# Patient Record
(demographics unavailable — no encounter records)

---

## 2025-02-12 NOTE — ASSESSMENT
[FreeTextEntry1] : After discussing various treatment options with the patient including but not limited to oral medications, physical therapy, exercise, modalities as well as interventional spinal injections, we have decided with the following plan:   1) Intervention Injection Therapy: I personally reviewed the MRI/CT scan images and agree with the radiologist's report. The radiological findings were discussed with the patient. The risks, benefits, contents and alternatives to injection were explained in full to the patient. Risks outlined include but are not limited to infection, sepsis, bleeding, post-dural puncture headache, nerve damage, temporary increase in pain, syncopal episode, failure to resolve symptoms, allergic reaction, symptom recurrence, and elevation of blood sugar in diabetics. Cortisone may cause immunosuppression. Patient understands the risks. All questions were answered. After discussion of options, patient requested an injection. Information regarding the injection was given to the patient. Which medications to stop prior to the injection was explained to the patient as well.   Follow up in 1-2 weeks post injection for re-evaluation. Continue Home exercises, stretching, activity modification, physical therapy, and conservative care.  Patient is presenting with acute/sub-acute radicular pain with impairment in ADLs and functionality.  The pain has not responded sufficiently to  conservative care including nsaid therapy and/or physical therapy.  There is no bleeding tendency, unstable medical condition, or systemic infection. The purpose of the spinal injections is to facilitate active therapy by providing short term relief through reduction of pain and inflammation.   Injections, by themselves, are not likely to provide long-term relief. Rather, active rehabilitation with modified work achieves long-term relief by increasing active ROM, strength and stability.   left L5/S1 and S1 TFESI   2) The patient would benefit from trial of physical therapy. Short- and Long-Term goals would be improvement of pain level, improvement of range of motion, improvement of strength and overall improvement of quality of life.   Patient instructed to continue both active and passive therapy, at home as an extension of the treatment process in order to maintain improvement.   Goals: improve cardiovascular fitness, reduce edema, improve muscle strength, improve connective tissue strength and integrity, increase bone density, promote circulation to enhance soft tissue healing, improvement of muscle recruitment, increased ROM and promotion of normal movement.

## 2025-02-12 NOTE — HISTORY OF PRESENT ILLNESS
[Lower back] : lower back [10] : 10 [0] : 0 [Sharp] : sharp [Shooting] : shooting [Tightness] : tightness [Tingling] : tingling [Intermittent] : intermittent [Household chores] : household chores [Social interactions] : social interactions [Rest] : rest [Meds] : meds [Standing] : standing [Walking] : walking [FreeTextEntry1] : 02/12/2025 : Patient presents for initial evaluation. He c/o pain in the lower back and posterior left leg. pain started to get worse over the last couple of weeks.  he had initially gone to [facility] and had TPI to the L spine and hamstring without relief.  Currently on steroids which is helping.    Subjective Weakness: No Numbness/Tingling: Yes Bladder/Bowel dysfunction: No Treatments Tried: TPI, medrol Blood Thinners:    Attempted modalities for current pain complaint: See above: Medications: Yes, methylprednisolone    Injections: No   Previous Spine Surgery: N/A   Imaging: MRI Lumbar Spine (2/11/25) OC: -personal review: large left DH at L4/5 contact of the descending L5 nerve root   [] : no [FreeTextEntry6] : numbness [FreeTextEntry7] : lt leg

## 2025-02-23 NOTE — PHYSICAL EXAM
[Normal Coordination] : normal coordination [Normal DTR UE/LE] : normal DTR UE/LE  [Normal Sensation] : normal sensation [Normal Mood and Affect] : normal mood and affect [Oriented] : oriented [Able to Communicate] : able to communicate [Normal Skin] : normal skin [No Rash] : no rash [No Ulcers] : no ulcers [No Lesions] : no lesions [No obvious lymphadenopathy in areas examined] : no obvious lymphadenopathy in areas examined [Well Developed] : well developed [Well Nourished] : well nourished [No Respiratory Distress] : no respiratory distress [Lungs clear to auscultation bilaterally] : lungs clear to auscultation bilaterally [Normal Bowel Sounds] : normal bowel sounds [Non-Tender] : non-tender [No HSM] : no HSM [No Mass] : no mass [NL (90)] : forward flexion 90 degrees [NL (30)] : right lateral rotation 30 degrees [NL (45)] : extension 45 degrees [NL (40)] : right lateral bending 40 degrees [5___] : right extensor hallicus longus 5[unfilled]/5 [] : non-antalgic

## 2025-02-23 NOTE — HISTORY OF PRESENT ILLNESS
[de-identified] : 02/19/2025 - Patient presents to the office for evaluation of left lower extremity pain. He reports spontaneous onset of symptoms on about 1/20/25. Initial complaints were dumbness tingling in the left lower extremity radiating distal into the calf with numbness in the toes. No symptoms on the right side. No changes in bowel or bladder function. No numbness in the groin. Not complaining of any significant back pain. He did have treatment with chiropractor and trigger point injections into the lower back and left knee without improvement. He has completed MRI imaging of the lumbar spine. Eight days ago, he initiated a major dose pack with significant improvement in his left leg. Period today in the office. He is not complaining of any significant discomfort.  The patient is a 59 year old male who presents today complaining of lower back pain Date of Injury/Onset: 01/20/25  Pain: At Rest: 0/10 With Activity:  2/10 Mechanism of injury: NKI  Quality of symptoms: N/A Improves with: PT- has had one PT session Worse with: N/A  Prior treatment: Stephan Schwab- MRI  Prior Imaging: XR [facility] , MRI- [facility]  Additional Information: None

## 2025-02-23 NOTE — DISCUSSION/SUMMARY
[de-identified] : Together in the office, we reviewed the MRI images and report in detail. Patient's symptoms consistent with left lumbar radiculopathy from lumbar disc herniation at L5/S1. He is clinically improving with recent administration of steroid pack. He is actively engaged in outpatient physical therapy. Recommend additional conservative treatment with spine conditioning program and oral non-steroid anti-inflammatory medication's as dictated by symptoms. Patient has had consultation with interventional pain management regarding potential steroid injection. No red flags on examination today. Recommend reevaluation in six weeks time. I have prescribed meloxicam 15 mg one tablet PO Q day with food as necessary for symptoms. Activity restrictions discussed and all questions were answered to his satisfaction.  Prior to appointment and during encounter with patient extensive medical records were reviewed including but not limited to, hospital records, outpatient records, imaging results, and lab data. During this appointment the patient was examined, diagnoses were discussed and explained in a face to face manner. In addition extensive time was spent reviewing aforementioned diagnostic studies. Counseling including abnormal image results, differential diagnoses, treatment options, risk and benefits, lifestyle changes, current condition, and current medications was performed. Patient's comments, questions, and concerns were addressed and patient verbalized understanding. Based on this patient's presentation at our office, which is an orthopedic spine surgeon's office, this patient inherently / intrinsically has a risk, however minute, of developing issues such as Cauda equina syndrome, bowel and bladder changes, or progression of motor or neurological deficits such as paralysis which may be permanent.   I, [MARY Aleman], certify that the clinical information was reviewed with Dr. Aguirre, who was physically present in the office. He agreed with the above plan of care and was available for consultation as necessary during the office visit.

## 2025-02-23 NOTE — DATA REVIEWED
[FreeTextEntry1] : On my interpretation of these images and reports MRI Lumbar spine OC Lumbar disc herniation left side second functional lumbar disc from the bottom. MRI report is naming this as L5/S1. There is mention of a non-aggressive, appearing hemangioma adjacent to the pedicle at L4  I stop paperwork reviewed Pain mgmt progress notes reviewed

## 2025-02-23 NOTE — DISCUSSION/SUMMARY
[de-identified] : Together in the office, we reviewed the MRI images and report in detail. Patient's symptoms consistent with left lumbar radiculopathy from lumbar disc herniation at L5/S1. He is clinically improving with recent administration of steroid pack. He is actively engaged in outpatient physical therapy. Recommend additional conservative treatment with spine conditioning program and oral non-steroid anti-inflammatory medication's as dictated by symptoms. Patient has had consultation with interventional pain management regarding potential steroid injection. No red flags on examination today. Recommend reevaluation in six weeks time. I have prescribed meloxicam 15 mg one tablet PO Q day with food as necessary for symptoms. Activity restrictions discussed and all questions were answered to his satisfaction.  Prior to appointment and during encounter with patient extensive medical records were reviewed including but not limited to, hospital records, outpatient records, imaging results, and lab data. During this appointment the patient was examined, diagnoses were discussed and explained in a face to face manner. In addition extensive time was spent reviewing aforementioned diagnostic studies. Counseling including abnormal image results, differential diagnoses, treatment options, risk and benefits, lifestyle changes, current condition, and current medications was performed. Patient's comments, questions, and concerns were addressed and patient verbalized understanding. Based on this patient's presentation at our office, which is an orthopedic spine surgeon's office, this patient inherently / intrinsically has a risk, however minute, of developing issues such as Cauda equina syndrome, bowel and bladder changes, or progression of motor or neurological deficits such as paralysis which may be permanent.   I, [MARY Aleman], certify that the clinical information was reviewed with Dr. Aguirre, who was physically present in the office. He agreed with the above plan of care and was available for consultation as necessary during the office visit.

## 2025-02-23 NOTE — HISTORY OF PRESENT ILLNESS
[de-identified] : 02/19/2025 - Patient presents to the office for evaluation of left lower extremity pain. He reports spontaneous onset of symptoms on about 1/20/25. Initial complaints were dumbness tingling in the left lower extremity radiating distal into the calf with numbness in the toes. No symptoms on the right side. No changes in bowel or bladder function. No numbness in the groin. Not complaining of any significant back pain. He did have treatment with chiropractor and trigger point injections into the lower back and left knee without improvement. He has completed MRI imaging of the lumbar spine. Eight days ago, he initiated a major dose pack with significant improvement in his left leg. Period today in the office. He is not complaining of any significant discomfort.  The patient is a 59 year old male who presents today complaining of lower back pain Date of Injury/Onset: 01/20/25  Pain: At Rest: 0/10 With Activity:  2/10 Mechanism of injury: NKI  Quality of symptoms: N/A Improves with: PT- has had one PT session Worse with: N/A  Prior treatment: Stephan Schwab- MRI  Prior Imaging: XR [facility] , MRI- [facility]  Additional Information: None

## 2025-04-16 NOTE — HISTORY OF PRESENT ILLNESS
[Retired] : Work status: retired [de-identified] : 04/14/2025 - Patient returns to the office for follow up. He continues to experience left leg pain consistent with lumbar radiculopathy. He has been actively engaged in physical therapy and utilizing anti-inflammatory medication's for the last three months. Symptoms are isolated to the left lower extremity. Not complaining of any subjective weakness. No right like symptoms.  02/19/2025 - Patient presents to the office for evaluation of left lower extremity pain. He reports spontaneous onset of symptoms on about 1/20/25. Initial complaints were dumbness tingling in the left lower extremity radiating distal into the calf with numbness in the toes. No symptoms on the right side. No changes in bowel or bladder function. No numbness in the groin. Not complaining of any significant back pain. He did have treatment with chiropractor and trigger point injections into the lower back and left knee without improvement. He has completed MRI imaging of the lumbar spine. Eight days ago, he initiated a major dose pack with significant improvement in his left leg. Period today in the office. He is not complaining of any significant discomfort.  The patient is a 59 year old male who presents today complaining of lower back pain Date of Injury/Onset: 01/20/25  Pain: At Rest: 0/10 With Activity:  2/10 Mechanism of injury: NKI  Quality of symptoms: N/A Improves with: PT- has had one PT session Worse with: N/A  Prior treatment: [facility]- MRI  Prior Imaging: XR [facility] , MRI- [facility]  Additional Information: None      [de-identified] : p/t, hep [FreeTextEntry5] : pain fluctuates

## 2025-04-16 NOTE — DATA REVIEWED
[FreeTextEntry1] : On my interpretation of these images and reports MRI Lumbar spine OC Lumbar disc herniation left side second functional lumbar disc from the bottom. MRI report is naming this as L5/S1. There is mention of a non-aggressive, appearing hemangioma adjacent to the pedicle at L4  I stop paperwork reviewed Pain mgmt progress notes reviewed PT progress notes reviewed

## 2025-04-16 NOTE — HISTORY OF PRESENT ILLNESS
[Retired] : Work status: retired [de-identified] : 04/14/2025 - Patient returns to the office for follow up. He continues to experience left leg pain consistent with lumbar radiculopathy. He has been actively engaged in physical therapy and utilizing anti-inflammatory medication's for the last three months. Symptoms are isolated to the left lower extremity. Not complaining of any subjective weakness. No right like symptoms.  02/19/2025 - Patient presents to the office for evaluation of left lower extremity pain. He reports spontaneous onset of symptoms on about 1/20/25. Initial complaints were dumbness tingling in the left lower extremity radiating distal into the calf with numbness in the toes. No symptoms on the right side. No changes in bowel or bladder function. No numbness in the groin. Not complaining of any significant back pain. He did have treatment with chiropractor and trigger point injections into the lower back and left knee without improvement. He has completed MRI imaging of the lumbar spine. Eight days ago, he initiated a major dose pack with significant improvement in his left leg. Period today in the office. He is not complaining of any significant discomfort.  The patient is a 59 year old male who presents today complaining of lower back pain Date of Injury/Onset: 01/20/25  Pain: At Rest: 0/10 With Activity:  2/10 Mechanism of injury: NKI  Quality of symptoms: N/A Improves with: PT- has had one PT session Worse with: N/A  Prior treatment: [facility]- MRI  Prior Imaging: XR [facility] , MRI- [facility]  Additional Information: None      [FreeTextEntry5] : pain fluctuates [de-identified] : p/t, hep

## 2025-04-16 NOTE — DISCUSSION/SUMMARY
[de-identified] : Together in the office, we again reviewed his MRI imaging and current working diagnosis of lumbar disc herniation L5S1 left with left S1 nerve impingement. Patient has failed conservative management at this point consisting of homebase exercises,  outpatient exercise based physical therapy program and oral anti-inflammatory medication. We discussed the risk and benefits potential complications and expected outcomes of microdiscectomy surgery L5S1 left to address this herniation. Patient understands and agrees to proceed. He wishes to schedule for early June.   Prior to appointment and during encounter with patient extensive medical records were reviewed including but not limited to, hospital records, outpatient records, imaging results, and lab data. During this appointment the patient was examined, diagnoses were discussed and explained in a face to face manner. In addition extensive time was spent reviewing aforementioned diagnostic studies. Counseling including abnormal image results, differential diagnoses, treatment options, risk and benefits, lifestyle changes, current condition, and current medications was performed. Patient's comments, questions, and concerns were addressed and patient verbalized understanding. Based on this patient's presentation at our office, which is an orthopedic spine surgeon's office, this patient inherently / intrinsically has a risk, however minute, of developing issues such as Cauda equina syndrome, bowel and bladder changes, or progression of motor or neurological deficits such as paralysis which may be permanent.    I, [MARY Aleman], certify that the clinical information was reviewed with Dr. Aguirre, who was physically present in the office. He agreed with the above plan of care and was available for consultation as necessary during the office visit.

## 2025-04-16 NOTE — PHYSICAL EXAM
[Normal Coordination] : normal coordination [Normal DTR UE/LE] : normal DTR UE/LE  [Normal Sensation] : normal sensation [Normal Mood and Affect] : normal mood and affect [Oriented] : oriented [Able to Communicate] : able to communicate [Normal Skin] : normal skin [No Rash] : no rash [No Ulcers] : no ulcers [No Lesions] : no lesions [No obvious lymphadenopathy in areas examined] : no obvious lymphadenopathy in areas examined [Well Developed] : well developed [Well Nourished] : well nourished [No Respiratory Distress] : no respiratory distress [NL (90)] : forward flexion 90 degrees [NL (30)] : right lateral rotation 30 degrees [NL (45)] : extension 45 degrees [NL (40)] : right lateral bending 40 degrees [5___] : right extensor hallicus longus 5[unfilled]/5 [] : non-antalgic [FreeTextEntry9] : Painful into left lower extremity with extension. Slight resolution of symptoms with extreme flexion.

## 2025-04-16 NOTE — HISTORY OF PRESENT ILLNESS
[Retired] : Work status: retired [de-identified] : 04/14/2025 - Patient returns to the office for follow up. He continues to experience left leg pain consistent with lumbar radiculopathy. He has been actively engaged in physical therapy and utilizing anti-inflammatory medication's for the last three months. Symptoms are isolated to the left lower extremity. Not complaining of any subjective weakness. No right like symptoms.  02/19/2025 - Patient presents to the office for evaluation of left lower extremity pain. He reports spontaneous onset of symptoms on about 1/20/25. Initial complaints were dumbness tingling in the left lower extremity radiating distal into the calf with numbness in the toes. No symptoms on the right side. No changes in bowel or bladder function. No numbness in the groin. Not complaining of any significant back pain. He did have treatment with chiropractor and trigger point injections into the lower back and left knee without improvement. He has completed MRI imaging of the lumbar spine. Eight days ago, he initiated a major dose pack with significant improvement in his left leg. Period today in the office. He is not complaining of any significant discomfort.  The patient is a 59 year old male who presents today complaining of lower back pain Date of Injury/Onset: 01/20/25  Pain: At Rest: 0/10 With Activity:  2/10 Mechanism of injury: NKI  Quality of symptoms: N/A Improves with: PT- has had one PT session Worse with: N/A  Prior treatment: [facility]- MRI  Prior Imaging: XR [facility] , MRI- [facility]  Additional Information: None      [de-identified] : p/t, hep [FreeTextEntry5] : pain fluctuates

## 2025-04-16 NOTE — DISCUSSION/SUMMARY
[de-identified] : Together in the office, we again reviewed his MRI imaging and current working diagnosis of lumbar disc herniation L5S1 left with left S1 nerve impingement. Patient has failed conservative management at this point consisting of homebase exercises,  outpatient exercise based physical therapy program and oral anti-inflammatory medication. We discussed the risk and benefits potential complications and expected outcomes of microdiscectomy surgery L5S1 left to address this herniation. Patient understands and agrees to proceed. He wishes to schedule for early June.   Prior to appointment and during encounter with patient extensive medical records were reviewed including but not limited to, hospital records, outpatient records, imaging results, and lab data. During this appointment the patient was examined, diagnoses were discussed and explained in a face to face manner. In addition extensive time was spent reviewing aforementioned diagnostic studies. Counseling including abnormal image results, differential diagnoses, treatment options, risk and benefits, lifestyle changes, current condition, and current medications was performed. Patient's comments, questions, and concerns were addressed and patient verbalized understanding. Based on this patient's presentation at our office, which is an orthopedic spine surgeon's office, this patient inherently / intrinsically has a risk, however minute, of developing issues such as Cauda equina syndrome, bowel and bladder changes, or progression of motor or neurological deficits such as paralysis which may be permanent.    I, [MARY Aleman], certify that the clinical information was reviewed with Dr. Aguirre, who was physically present in the office. He agreed with the above plan of care and was available for consultation as necessary during the office visit.

## 2025-04-16 NOTE — DISCUSSION/SUMMARY
[de-identified] : Together in the office, we again reviewed his MRI imaging and current working diagnosis of lumbar disc herniation L5S1 left with left S1 nerve impingement. Patient has failed conservative management at this point consisting of homebase exercises,  outpatient exercise based physical therapy program and oral anti-inflammatory medication. We discussed the risk and benefits potential complications and expected outcomes of microdiscectomy surgery L5S1 left to address this herniation. Patient understands and agrees to proceed. He wishes to schedule for early June.   Prior to appointment and during encounter with patient extensive medical records were reviewed including but not limited to, hospital records, outpatient records, imaging results, and lab data. During this appointment the patient was examined, diagnoses were discussed and explained in a face to face manner. In addition extensive time was spent reviewing aforementioned diagnostic studies. Counseling including abnormal image results, differential diagnoses, treatment options, risk and benefits, lifestyle changes, current condition, and current medications was performed. Patient's comments, questions, and concerns were addressed and patient verbalized understanding. Based on this patient's presentation at our office, which is an orthopedic spine surgeon's office, this patient inherently / intrinsically has a risk, however minute, of developing issues such as Cauda equina syndrome, bowel and bladder changes, or progression of motor or neurological deficits such as paralysis which may be permanent.    I, [MARY Aleman], certify that the clinical information was reviewed with Dr. Aguirre, who was physically present in the office. He agreed with the above plan of care and was available for consultation as necessary during the office visit.